# Patient Record
Sex: FEMALE | Race: WHITE | NOT HISPANIC OR LATINO | Employment: FULL TIME | URBAN - METROPOLITAN AREA
[De-identification: names, ages, dates, MRNs, and addresses within clinical notes are randomized per-mention and may not be internally consistent; named-entity substitution may affect disease eponyms.]

---

## 2018-02-08 ENCOUNTER — OFFICE VISIT CONVERTED (OUTPATIENT)
Dept: ORTHOPEDIC SURGERY | Facility: CLINIC | Age: 23
End: 2018-02-08
Attending: ORTHOPAEDIC SURGERY

## 2018-04-09 ENCOUNTER — OFFICE VISIT CONVERTED (OUTPATIENT)
Dept: ORTHOPEDIC SURGERY | Facility: CLINIC | Age: 23
End: 2018-04-09
Attending: ORTHOPAEDIC SURGERY

## 2018-05-21 ENCOUNTER — OFFICE VISIT CONVERTED (OUTPATIENT)
Dept: ORTHOPEDIC SURGERY | Facility: CLINIC | Age: 23
End: 2018-05-21
Attending: ORTHOPAEDIC SURGERY

## 2019-10-22 ENCOUNTER — OFFICE VISIT CONVERTED (OUTPATIENT)
Dept: ORTHOPEDIC SURGERY | Facility: CLINIC | Age: 24
End: 2019-10-22
Attending: ORTHOPAEDIC SURGERY

## 2020-04-25 ENCOUNTER — HOSPITAL ENCOUNTER (OUTPATIENT)
Dept: URGENT CARE | Facility: CLINIC | Age: 25
Discharge: HOME | End: 2020-04-25
Attending: PHYSICIAN ASSISTANT

## 2020-04-25 LAB — GLUCOSE BLD-MCNC: 107 MG/DL (ref 65–99)

## 2020-04-27 LAB — BACTERIA SPEC AEROBE CULT: NORMAL

## 2021-01-05 ENCOUNTER — OFFICE VISIT CONVERTED (OUTPATIENT)
Dept: ORTHOPEDIC SURGERY | Facility: CLINIC | Age: 26
End: 2021-01-05
Attending: ORTHOPAEDIC SURGERY

## 2021-01-21 ENCOUNTER — HOSPITAL ENCOUNTER (OUTPATIENT)
Dept: MRI IMAGING | Facility: HOSPITAL | Age: 26
Discharge: HOME | End: 2021-01-21
Attending: ORTHOPAEDIC SURGERY

## 2021-01-26 ENCOUNTER — OFFICE VISIT CONVERTED (OUTPATIENT)
Dept: ORTHOPEDIC SURGERY | Facility: CLINIC | Age: 26
End: 2021-01-26
Attending: ORTHOPAEDIC SURGERY

## 2021-05-10 NOTE — H&P
"   History and Physical      Patient Name: Britta Paul   Patient ID: [id number]   Sex: Female   YOB: 1995        Visit Date: January 5, 2021    Provider: Pablo Correa MD   Location: [facility]   Location Address: [address] [place] [address]  [id number]   Location Phone: [phone]          Chief Complaint  · Right knee pain      History Of Present Illness  Britta Paul is a 25 year old /White female who presents today to [facility]. Patient presents for evaluation of right knee pain. Patient was seen back in 2019 for right knee pain, mainly in her kneecap with some loose sensation of her right kneecap. Patient states she rested the knee and it got better. Patient states she has been doing well up until this past November 2020 she states her pain returning got worse over the last few months. Patient states she was at work, she works at [employer] and her right knee \"gave out \"on 12/18/2020, she had pain, she went to the ER, had x-rays. Patient has been taking ibuprofen, states her pain persists in the anterior knee, she states the pain is worse with standing walking for long periods. Patient presents with a cane, she states she use the cane when her knee bothers her after long day. Patient denies swelling to the knee, admits to popping, denies locking and catching. Patient remembered her home exercises with some improvement.       Past Medical History  Anemia, Unspecified; Anxiety; Asthma; Chest pain; Constipation; Depression; Diabetes; Ganglion cyst of wrist, right; Ganglion cyst--right wrist; Limb Swelling; Pain: Wrist, right; Right ulnar sided wrist pain; Right wrist pain; Right wrist pain--ulnar sided; Seasonal allergies; Shortness of Breath         Past Surgical History  Colonoscopy; [place] Tooth Extraction         Medication List  Daily Multiple  mg-mcg oral tablet; ibuprofen 200 mg oral capsule; iron 325 mg (65 mg iron) oral capsule, extended " "release; Stool Softener 50 mg oral capsule; Tylenol 325 mg oral tablet         Allergy List  Latex; Latex Exam Gloves       Allergies Reconciled  Family Medical History  Diabetes, unspecified type; - No Family History of Colorectal Cancer; Family history of certain chronic disabling diseases; arthritis; Family history of Arthritis         Social History  Alcohol (Current some day); Alcohol Use (Current some day); lives with other; Recreational Drug Use (Never); Single.; Tobacco (Never); Working         Review of Systems  · Constitutional  o Denies  o : fever, chills, weight loss  · Cardiovascular  o Denies  o : chest pain, shortness of breath  · Gastrointestinal  o Denies  o : liver disease, heartburn, nausea, blood in stools  · Genitourinary  o Denies  o : painful urination, blood in urine  · Integument  o Denies  o : rash, itching  · Neurologic  o Denies  o : headache, weakness, loss of consciousness  · Musculoskeletal  o Denies  o : painful, swollen joints  · Psychiatric  o Denies  o : drug/alcohol addiction, anxiety, depression      Vitals  Date Time BP Position Site L\R Cuff Size HR RR TEMP (F) WT  HT  BMI kg/m2 BSA m2 O2 Sat FR L/min FiO2 HC       01/05/2021 03:40 PM         153lbs 0oz 5'  5\" 25.46 1.78             Physical Examination  · Constitutional  o Appearance  o : well developed, well-nourished, no obvious deformities present  · Head and Face  o Head  o :   § Inspection  § : normocephalic  o Face  o :   § Inspection  § : no facial lesions  · Eyes  o Conjunctivae  o : conjunctivae normal  o Sclerae  o : sclerae white  · Ears, Nose, Mouth and Throat  o Ears  o :   § External Ears  § : appearance within normal limits  § Hearing  § : intact  o Nose  o :   § External Nose  § : appearance normal  · Neck  o Inspection/Palpation  o : normal appearance  o Range of Motion  o : full range of motion  · Respiratory  o Respiratory Effort  o : breathing unlabored  o Inspection of Chest  o : normal " appearance  o Auscultation of Lungs  o : no audible wheezing or rales  · Cardiovascular  o Heart  o : regular rate  · Gastrointestinal  o Abdominal Examination  o : soft and non-tender  · Skin and Subcutaneous Tissue  o General Inspection  o : intact, no rashes  · Psychiatric  o General  o : Alert and oriented x3  o Judgement and Insight  o : judgment and insight intact  o Mood and Affect  o : mood normal, affect appropriate  · Right Knee  o Inspection  o : Skin is intact, no erythema, no swelling, no ecchymosis, no effusion, no signs of infection. Nontender to palpation. No pain with range of motion. Full extension, flexion 120, stable anterior/posterior drawer, stable to varus/valgus stress normal patellar tracking.  · Imaging  o Imaging  o : X-ray right knee, 12/18/2020, conclusion: Negative right knee series.          Assessment  · Right knee pain, unspecified chronicity     719.46/M25.561  · Right knee injury     959.7/S89.91XA      Plan  · Instructions  o Reviewed the patient's Past Medical, Social, and Family history as well as the ROS at today's visit, no changes.  o Call or return if worsening symptoms.  o Follow up after MRI.  o The above service was scribed by Macho Sarmiento PA-C on my behalf and I attest to the accuracy of the note. jsb  o Reviewed x-ray with the patient, discussed need for MRI for further evaluation of the right knee. Patient states she has knee brace at home, she was advised to use knee brace, weightbearing and activity as tolerated, follow-up after MRI.            Electronically Signed by: Gerardo Sarmiento PA-C -Author on January 5, 2021 04:42:31 PM  Electronically Co-signed by: Pablo Correa MD -Reviewer on January 5, 2021 06:45:06 PM

## 2021-05-14 VITALS — BODY MASS INDEX: 25.33 KG/M2 | HEIGHT: 65 IN | WEIGHT: 152 LBS

## 2021-05-14 VITALS — HEIGHT: 65 IN | BODY MASS INDEX: 25.49 KG/M2 | WEIGHT: 153 LBS

## 2021-05-14 NOTE — PROGRESS NOTES
Progress Note      Patient Name: Britta Paul   Patient ID: [id number]   Sex: Female   YOB: 1995        Visit Date: January 26, 2021    Provider: Pablo Correa MD   Location: [facility]   Location Address: [address] [place] [address]  [id number]   Location Phone: [phone]          Chief Complaint  · Right knee pain      History Of Present Illness  Britta Paul is a 26 year old /White female who presents today to [facility]. Patient presents for follow-up evaluation of right knee pain and to review right knee MRI. Patient was recently seen on January 5, planing of increased right knee pain, states her knee has given out and caused her pain in the anterior superior knee, around her kneecap, worse with working, patient works at [employer] working on her feet all day. Patient was seen in the past for similar knee pain symptoms. Patient states the knee occasionally swells, she admits to popping, denies locking and catching. Patient has been doing home exercises from past physical therapy visits with no improvement.       Past Medical History  Anemia, Unspecified; Anxiety; Asthma; Chest pain; Constipation; Depression; Diabetes; Ganglion cyst of wrist, right; Ganglion cyst--right wrist; Limb Swelling; Pain: Wrist, right; Right ulnar sided wrist pain; Right wrist pain; Right wrist pain--ulnar sided; Seasonal allergies; Shortness of Breath         Past Surgical History  Colonoscopy; [place] Tooth Extraction         Medication List  Daily Multiple  mg-mcg oral tablet; ibuprofen 200 mg oral capsule; iron 325 mg (65 mg iron) oral capsule, extended release; Stool Softener 50 mg oral capsule; Tylenol 325 mg oral tablet         Allergy List  Latex; Latex Exam Gloves       Allergies Reconciled  Family Medical History  Diabetes, unspecified type; - No Family History of Colorectal Cancer; Family history of certain chronic disabling diseases; arthritis; Family history of  "Arthritis         Social History  Alcohol (Current some day); Alcohol Use (Current some day); lives with other; Recreational Drug Use (Never); Single.; Tobacco (Never); Working         Review of Systems  · Constitutional  o Denies  o : fever, chills, weight loss  · Cardiovascular  o Denies  o : chest pain, shortness of breath  · Gastrointestinal  o Denies  o : liver disease, heartburn, nausea, blood in stools  · Genitourinary  o Denies  o : painful urination, blood in urine  · Integument  o Denies  o : rash, itching  · Neurologic  o Denies  o : headache, weakness, loss of consciousness  · Musculoskeletal  o Denies  o : painful, swollen joints  · Psychiatric  o Denies  o : drug/alcohol addiction, anxiety, depression      Vitals  Date Time BP Position Site L\R Cuff Size HR RR TEMP (F) WT  HT  BMI kg/m2 BSA m2 O2 Sat FR L/min FiO2        01/26/2021 02:55 PM         152lbs 0oz 5'  5\" 25.29 1.78             Physical Examination  · Constitutional  o Appearance  o : well developed, well-nourished, no obvious deformities present  · Head and Face  o Head  o :   § Inspection  § : normocephalic  o Face  o :   § Inspection  § : no facial lesions  · Eyes  o Conjunctivae  o : conjunctivae normal  o Sclerae  o : sclerae white  · Ears, Nose, Mouth and Throat  o Ears  o :   § External Ears  § : appearance within normal limits  § Hearing  § : intact  o Nose  o :   § External Nose  § : appearance normal  · Neck  o Inspection/Palpation  o : normal appearance  o Range of Motion  o : full range of motion  · Respiratory  o Respiratory Effort  o : breathing unlabored  o Inspection of Chest  o : normal appearance  o Auscultation of Lungs  o : no audible wheezing or rales  · Cardiovascular  o Heart  o : regular rate  · Gastrointestinal  o Abdominal Examination  o : soft and non-tender  · Skin and Subcutaneous Tissue  o General Inspection  o : intact, no rashes  · Psychiatric  o General  o : Alert and oriented x3  o Judgement and " Insight  o : judgment and insight intact  o Mood and Affect  o : mood normal, affect appropriate  · Right Knee  o Inspection  o : Skin is intact, no erythema, no swelling, no ecchymosis, no effusion, no signs of infection. Nontender to palpation. No pain with range of motion. Full extension, flexion 120, stable anterior/posterior drawer, stable to varus/valgus stress normal patellar tracking.   · Imaging  o Imaging  o : MRI right knee without contrast, 1/21/2021, conclusion: 1. No evidence for internal derangement or evidence for trochlear dysplasia type A. Note is also made of lateral insertion of the distal patellar tendon. The findings indicate anatomic variation and may lead to a tracking disturbance of the patella.X-ray right knee, 12/18/2020, conclusion: Negative right knee series.           Assessment  · Right knee pain, unspecified chronicity     719.46/M25.561  · Patellar maltracking, right     719.86/M22.8X1      Plan  · Medications  o Medications have been Reconciled  o Transition of Care or Provider Policy  · Instructions  o Reviewed the patient's Past Medical, Social, and Family history as well as the ROS at today's visit, no changes.  o Call or return if worsening symptoms.  o Follow up as needed.  o Reviewed MRI with the patient, advised her we will refer her to [facility] medicine, Dr. Mart for further evaluation. Follow-up as needed.            Electronically Signed by: ELSA Moss-MATT -Author on January 26, 2021 05:09:20 PM  Electronically Co-signed by: Pablo Correa MD -Reviewer on January 27, 2021 07:17:47 AM

## 2021-05-15 VITALS — OXYGEN SATURATION: 99 % | WEIGHT: 160.25 LBS | HEIGHT: 65 IN | HEART RATE: 70 BPM | BODY MASS INDEX: 26.7 KG/M2

## 2021-05-16 VITALS — BODY MASS INDEX: 24.33 KG/M2 | HEART RATE: 78 BPM | HEIGHT: 66 IN | OXYGEN SATURATION: 99 % | WEIGHT: 151.37 LBS

## 2021-05-16 VITALS — WEIGHT: 158.12 LBS | BODY MASS INDEX: 25.41 KG/M2 | HEIGHT: 66 IN | OXYGEN SATURATION: 99 % | HEART RATE: 79 BPM

## 2021-05-16 VITALS — WEIGHT: 138 LBS | OXYGEN SATURATION: 96 % | BODY MASS INDEX: 21.66 KG/M2 | HEIGHT: 67 IN | HEART RATE: 67 BPM

## 2021-09-07 ENCOUNTER — APPOINTMENT (OUTPATIENT)
Dept: GENERAL RADIOLOGY | Facility: HOSPITAL | Age: 26
End: 2021-09-07

## 2021-09-07 ENCOUNTER — HOSPITAL ENCOUNTER (EMERGENCY)
Facility: HOSPITAL | Age: 26
Discharge: HOME | End: 2021-09-07
Attending: EMERGENCY MEDICINE | Admitting: EMERGENCY MEDICINE

## 2021-09-07 VITALS
WEIGHT: 144.62 LBS | OXYGEN SATURATION: 99 % | DIASTOLIC BLOOD PRESSURE: 59 MMHG | HEIGHT: 65 IN | TEMPERATURE: 98.4 F | HEART RATE: 55 BPM | SYSTOLIC BLOOD PRESSURE: 108 MMHG | RESPIRATION RATE: 15 BRPM | BODY MASS INDEX: 24.1 KG/M2

## 2021-09-07 DIAGNOSIS — Z20.822 COVID-19 VIRUS TEST RESULT UNKNOWN: ICD-10-CM

## 2021-09-07 DIAGNOSIS — N39.0 ACUTE UTI: ICD-10-CM

## 2021-09-07 DIAGNOSIS — R55 NEAR SYNCOPE: Primary | ICD-10-CM

## 2021-09-07 LAB
ALBUMIN SERPL-MCNC: 4.5 G/DL (ref 3.5–5.2)
ALBUMIN/GLOB SERPL: 1.7 G/DL
ALP SERPL-CCNC: 51 U/L (ref 39–117)
ALT SERPL W P-5'-P-CCNC: 7 U/L (ref 1–33)
ANION GAP SERPL CALCULATED.3IONS-SCNC: 9.2 MMOL/L (ref 5–15)
AST SERPL-CCNC: 10 U/L (ref 1–32)
B-HCG UR QL: NEGATIVE
BACTERIA UR QL AUTO: ABNORMAL /HPF
BASOPHILS # BLD AUTO: 0.03 10*3/MM3 (ref 0–0.2)
BASOPHILS NFR BLD AUTO: 0.4 % (ref 0–1.5)
BILIRUB SERPL-MCNC: 0.3 MG/DL (ref 0–1.2)
BILIRUB UR QL STRIP: NEGATIVE
BUN SERPL-MCNC: 8 MG/DL (ref 6–20)
BUN/CREAT SERPL: 11.8 (ref 7–25)
CALCIUM SPEC-SCNC: 9.1 MG/DL (ref 8.6–10.5)
CHLORIDE SERPL-SCNC: 106 MMOL/L (ref 98–107)
CLARITY UR: ABNORMAL
CO2 SERPL-SCNC: 21.8 MMOL/L (ref 22–29)
COLOR UR: YELLOW
CREAT SERPL-MCNC: 0.68 MG/DL (ref 0.57–1)
DEPRECATED RDW RBC AUTO: 45.4 FL (ref 37–54)
EOSINOPHIL # BLD AUTO: 0.03 10*3/MM3 (ref 0–0.4)
EOSINOPHIL NFR BLD AUTO: 0.4 % (ref 0.3–6.2)
ERYTHROCYTE [DISTWIDTH] IN BLOOD BY AUTOMATED COUNT: 14.1 % (ref 12.3–15.4)
GFR SERPL CREATININE-BSD FRML MDRD: 103 ML/MIN/1.73
GLOBULIN UR ELPH-MCNC: 2.6 GM/DL
GLUCOSE SERPL-MCNC: 88 MG/DL (ref 65–99)
GLUCOSE UR STRIP-MCNC: NEGATIVE MG/DL
HCT VFR BLD AUTO: 39.4 % (ref 34–46.6)
HGB BLD-MCNC: 13.1 G/DL (ref 12–15.9)
HGB UR QL STRIP.AUTO: NEGATIVE
HOLD SPECIMEN: NORMAL
HOLD SPECIMEN: NORMAL
HYALINE CASTS UR QL AUTO: ABNORMAL /LPF
IMM GRANULOCYTES # BLD AUTO: 0.02 10*3/MM3 (ref 0–0.05)
IMM GRANULOCYTES NFR BLD AUTO: 0.3 % (ref 0–0.5)
KETONES UR QL STRIP: NEGATIVE
LEUKOCYTE ESTERASE UR QL STRIP.AUTO: ABNORMAL
LYMPHOCYTES # BLD AUTO: 2.42 10*3/MM3 (ref 0.7–3.1)
LYMPHOCYTES NFR BLD AUTO: 32 % (ref 19.6–45.3)
MCH RBC QN AUTO: 29.4 PG (ref 26.6–33)
MCHC RBC AUTO-ENTMCNC: 33.2 G/DL (ref 31.5–35.7)
MCV RBC AUTO: 88.5 FL (ref 79–97)
MONOCYTES # BLD AUTO: 0.68 10*3/MM3 (ref 0.1–0.9)
MONOCYTES NFR BLD AUTO: 9 % (ref 5–12)
NEUTROPHILS NFR BLD AUTO: 4.38 10*3/MM3 (ref 1.7–7)
NEUTROPHILS NFR BLD AUTO: 57.9 % (ref 42.7–76)
NITRITE UR QL STRIP: POSITIVE
NRBC BLD AUTO-RTO: 0 /100 WBC (ref 0–0.2)
PH UR STRIP.AUTO: 6.5 [PH] (ref 5–8)
PLATELET # BLD AUTO: 310 10*3/MM3 (ref 140–450)
PMV BLD AUTO: 10.8 FL (ref 6–12)
POTASSIUM SERPL-SCNC: 4.4 MMOL/L (ref 3.5–5.2)
PROT SERPL-MCNC: 7.1 G/DL (ref 6–8.5)
PROT UR QL STRIP: NEGATIVE
RBC # BLD AUTO: 4.45 10*6/MM3 (ref 3.77–5.28)
RBC # UR: ABNORMAL /HPF
REF LAB TEST METHOD: ABNORMAL
S PYO AG THROAT QL: NEGATIVE
SARS-COV-2 RNA RESP QL NAA+PROBE: NOT DETECTED
SODIUM SERPL-SCNC: 137 MMOL/L (ref 136–145)
SP GR UR STRIP: 1.01 (ref 1–1.03)
SQUAMOUS #/AREA URNS HPF: ABNORMAL /HPF
UROBILINOGEN UR QL STRIP: ABNORMAL
WBC # BLD AUTO: 7.56 10*3/MM3 (ref 3.4–10.8)
WBC UR QL AUTO: ABNORMAL /HPF
WHOLE BLOOD HOLD SPECIMEN: NORMAL
WHOLE BLOOD HOLD SPECIMEN: NORMAL

## 2021-09-07 PROCEDURE — 87880 STREP A ASSAY W/OPTIC: CPT | Performed by: NURSE PRACTITIONER

## 2021-09-07 PROCEDURE — 71045 X-RAY EXAM CHEST 1 VIEW: CPT

## 2021-09-07 PROCEDURE — 99284 EMERGENCY DEPT VISIT MOD MDM: CPT

## 2021-09-07 PROCEDURE — 87081 CULTURE SCREEN ONLY: CPT | Performed by: NURSE PRACTITIONER

## 2021-09-07 PROCEDURE — 81001 URINALYSIS AUTO W/SCOPE: CPT | Performed by: NURSE PRACTITIONER

## 2021-09-07 PROCEDURE — 81025 URINE PREGNANCY TEST: CPT | Performed by: NURSE PRACTITIONER

## 2021-09-07 PROCEDURE — 25010000002 KETOROLAC TROMETHAMINE PER 15 MG: Performed by: NURSE PRACTITIONER

## 2021-09-07 PROCEDURE — 87186 SC STD MICRODIL/AGAR DIL: CPT | Performed by: NURSE PRACTITIONER

## 2021-09-07 PROCEDURE — 36415 COLL VENOUS BLD VENIPUNCTURE: CPT

## 2021-09-07 PROCEDURE — 93005 ELECTROCARDIOGRAM TRACING: CPT | Performed by: NURSE PRACTITIONER

## 2021-09-07 PROCEDURE — 80053 COMPREHEN METABOLIC PANEL: CPT

## 2021-09-07 PROCEDURE — U0003 INFECTIOUS AGENT DETECTION BY NUCLEIC ACID (DNA OR RNA); SEVERE ACUTE RESPIRATORY SYNDROME CORONAVIRUS 2 (SARS-COV-2) (CORONAVIRUS DISEASE [COVID-19]), AMPLIFIED PROBE TECHNIQUE, MAKING USE OF HIGH THROUGHPUT TECHNOLOGIES AS DESCRIBED BY CMS-2020-01-R: HCPCS | Performed by: NURSE PRACTITIONER

## 2021-09-07 PROCEDURE — 87086 URINE CULTURE/COLONY COUNT: CPT | Performed by: NURSE PRACTITIONER

## 2021-09-07 PROCEDURE — 85025 COMPLETE CBC W/AUTO DIFF WBC: CPT

## 2021-09-07 PROCEDURE — 96374 THER/PROPH/DIAG INJ IV PUSH: CPT

## 2021-09-07 PROCEDURE — 93010 ELECTROCARDIOGRAM REPORT: CPT | Performed by: INTERNAL MEDICINE

## 2021-09-07 PROCEDURE — 87077 CULTURE AEROBIC IDENTIFY: CPT | Performed by: NURSE PRACTITIONER

## 2021-09-07 RX ORDER — CEPHALEXIN 500 MG/1
500 CAPSULE ORAL 4 TIMES DAILY
Qty: 28 CAPSULE | Refills: 0 | Status: SHIPPED | OUTPATIENT
Start: 2021-09-07 | End: 2022-01-13

## 2021-09-07 RX ORDER — SODIUM CHLORIDE 0.9 % (FLUSH) 0.9 %
10 SYRINGE (ML) INJECTION AS NEEDED
Status: DISCONTINUED | OUTPATIENT
Start: 2021-09-07 | End: 2021-09-07 | Stop reason: HOSPADM

## 2021-09-07 RX ORDER — MULTIPLE VITAMINS W/ MINERALS TAB 9MG-400MCG
1 TAB ORAL DAILY
COMMUNITY

## 2021-09-07 RX ORDER — KETOROLAC TROMETHAMINE 15 MG/ML
15 INJECTION, SOLUTION INTRAMUSCULAR; INTRAVENOUS ONCE
Status: COMPLETED | OUTPATIENT
Start: 2021-09-07 | End: 2021-09-07

## 2021-09-07 RX ADMIN — KETOROLAC TROMETHAMINE 15 MG: 15 INJECTION, SOLUTION INTRAMUSCULAR; INTRAVENOUS at 13:26

## 2021-09-07 NOTE — ED PROVIDER NOTES
"Subjective    Chief Complaint   Patient presents with   • Headache   • Dizziness   • Abdominal Pain     Kwabena Rowell MD  No LMP recorded.  Allergies   Allergen Reactions   • Latex Unknown - High Severity       History of Present Illness  Patient is a 26-year-old female, presents emergency department with complaint of generalized weakness, headache, near syncope, abdominal pain.  Patient reports this began yesterday, she reports that she did feeling bad at work yesterday, and almost had a near syncopal episode.  She states that when she \"came to\" more she thought she saw a black, dog.  She otherwise denies any hallucinations.  No auditory hallucinations.  No vision changes.  No thunderclap headache.  No speech disturbances.  No lethargy or ataxia.  Patient has not had COVID-19 in the past.  She is not been vaccinated.  Review of Systems   Constitutional: Negative for chills and fever.   Eyes: Negative for photophobia and visual disturbance.   Respiratory: Negative for cough, chest tightness and shortness of breath.    Cardiovascular: Negative for chest pain, palpitations and leg swelling.   Gastrointestinal: Positive for abdominal pain. Negative for diarrhea, nausea and vomiting.   Genitourinary: Negative for dysuria.   Musculoskeletal: Negative for back pain and neck pain.   Skin: Negative for color change and rash.   Neurological: Positive for syncope (Near syncope), weakness (Generalized) and headaches. Negative for dizziness, tremors, seizures, facial asymmetry, speech difficulty, light-headedness and numbness.       Past Medical History:   Diagnosis Date   • Anemia    • Asthma    • Hypoglycemia        Allergies   Allergen Reactions   • Latex Unknown - High Severity       Past Surgical History:   Procedure Laterality Date   • DENTAL PROCEDURE         History reviewed. No pertinent family history.    Social History     Socioeconomic History   • Marital status: Single     Spouse name: Not on file   • Number of " children: Not on file   • Years of education: Not on file   • Highest education level: Not on file   Tobacco Use   • Smoking status: Never Smoker   • Smokeless tobacco: Never Used   Substance and Sexual Activity   • Alcohol use: Yes     Comment: Occasional   • Drug use: Never           Objective   Physical Exam  Vitals and nursing note reviewed.   Constitutional:       General: She is not in acute distress.     Appearance: She is well-developed. She is not ill-appearing, toxic-appearing or diaphoretic.   HENT:      Head: Normocephalic and atraumatic.      Mouth/Throat:      Mouth: Mucous membranes are moist.      Pharynx: Oropharynx is clear. No pharyngeal swelling or oropharyngeal exudate.   Eyes:      Extraocular Movements: Extraocular movements intact.      Pupils: Pupils are equal, round, and reactive to light.   Cardiovascular:      Rate and Rhythm: Normal rate and regular rhythm.      Heart sounds: Normal heart sounds. No murmur heard.   No friction rub. No gallop.    Pulmonary:      Effort: Pulmonary effort is normal. No respiratory distress.      Breath sounds: Normal breath sounds. No stridor. No wheezing or rhonchi.   Chest:      Chest wall: No tenderness.   Abdominal:      General: Abdomen is protuberant. Bowel sounds are normal.      Palpations: Abdomen is soft.      Tenderness: There is no abdominal tenderness (No peritoneal signs). There is no guarding or rebound.   Skin:     General: Skin is warm and dry.      Capillary Refill: Capillary refill takes less than 2 seconds.   Neurological:      General: No focal deficit present.      Mental Status: She is alert and oriented to person, place, and time.      GCS: GCS eye subscore is 4. GCS verbal subscore is 5. GCS motor subscore is 6.      Cranial Nerves: No dysarthria or facial asymmetry.      Motor: No weakness or pronator drift.      Coordination: Coordination normal. Finger-Nose-Finger Test normal.      Gait: Gait is intact.      Comments: Cranial  "nerves II-XII intact.    Psychiatric:         Mood and Affect: Mood normal.         Behavior: Behavior normal.         Procedures           ED Course      /59   Pulse 55   Temp 98.4 °F (36.9 °C) (Oral)   Resp 15   Ht 165.1 cm (65\")   Wt 65.6 kg (144 lb 10 oz)   SpO2 99%   BMI 24.07 kg/m²   Labs Reviewed   COMPREHENSIVE METABOLIC PANEL - Abnormal; Notable for the following components:       Result Value    CO2 21.8 (*)     All other components within normal limits    Narrative:     GFR Normal >60  Chronic Kidney Disease <60  Kidney Failure <15     URINALYSIS W/ MICROSCOPIC IF INDICATED (NO CULTURE) - Abnormal; Notable for the following components:    Appearance, UA Turbid (*)     Leuk Esterase, UA Moderate (2+) (*)     Nitrite, UA Positive (*)     All other components within normal limits   URINALYSIS, MICROSCOPIC ONLY - Abnormal; Notable for the following components:    RBC, UA 0-2 (*)     WBC, UA 6-12 (*)     Bacteria, UA 4+ (*)     Squamous Epithelial Cells, UA Too Numerous to Count (*)     All other components within normal limits   RAPID STREP A SCREEN - Normal   CBC WITH AUTO DIFFERENTIAL - Normal   PREGNANCY, URINE - Normal    Narrative:     Sensitive immunoassays may demonstrate false positive results  with specimens containing heterophilic antibodies. Assays may  also exhibit false-positive or false-negative results with  specimens containing human anti-mouse antibodies. These   specimens may come from patients receving preparations of  mouse monoclonal antibodies for diagnosis or therapy or having  been exposed to mice. If the qualitative interpretation is  inconsistent with the clinical evaluation, results should be   confirmed by an alternate hCG method, ie. quantitative hCG.  As with all urine pregnancy test, this test does not reliably  detect hCG degradation products, including free-beta hCG and  beta core fragments.   COVID-19,CEPHEID/KUSH/BDMAX,COR/INGRIS/PAD/YANELI IN-HOUSE,NP SWAB IN " TRANSPORT MEDIA 3-4 HR TAT, RT-PCR   BETA HEMOLYTIC STREP CULTURE, THROAT   BETA HEMOLYTIC STREP CULTURE, THROAT   URINE CULTURE   RAINBOW DRAW    Narrative:     The following orders were created for panel order Malcolm Draw.  Procedure                               Abnormality         Status                     ---------                               -----------         ------                     Green Top (Gel)[[id number]]                                  Final result               Lavender Top[[id number]]                                     Final result               Gold Top - SST[[id number]]                                   Final result               Light Blue Top[[id number]]                                   Final result                 Please view results for these tests on the individual orders.   CBC AND DIFFERENTIAL    Narrative:     The following orders were created for panel order CBC & Differential.  Procedure                               Abnormality         Status                     ---------                               -----------         ------                     CBC Auto Differential[[id number]]        Normal              Final result                 Please view results for these tests on the individual orders.   GREEN TOP   LAVENDER TOP   GOLD TOP - SST   LIGHT BLUE TOP     Medications   ketorolac (TORADOL) injection 15 mg (15 mg Intravenous Given 9/7/21 1326)     XR Chest 1 View    Result Date: 9/7/2021  Narrative: PROCEDURE: XR CHEST 1 VW  COMPARISON: [facility], CR, CHEST PA/AP & LAT 2V, 4/25/2020, 18:07.  INDICATIONS: NEAR SYNCOPE; DIZZINESS  FINDINGS:  The lungs are clear. Cardiac, hilar, and mediastinal silhouettes are unremarkable. No pneumothorax or pleural effusion. Bony structures are intact.  The trachea is midline.      CONCLUSION: No active cardiopulmonary disease.       HEATH LAKE DO       Electronically Signed and Approved By: HEATH LAKE DO on 9/07/2021 at 12:59                                                   MDM  Number of Diagnoses or Management Options     Amount and/or Complexity of Data Reviewed  Clinical lab tests: reviewed  Tests in the radiology section of CPT®: reviewed  Tests in the medicine section of CPT®: reviewed    Appropriate PPE was worn during the duration of the care for this patient while in the emergency department per [facility] Policy    ----Differentials: Orthostatic hypotension, electrolyte abnormality, COVID-19  This list is not all inclusive and does not constitute the entireity of considered causes.     ----Lab and imaging reviewd by me, imaging interpreted per radiologist and independly viewed by me:     As above.  CBC, CMP nonconcerning.  Urine is nitrite positive.  Patient reports she frequently gets UTIs, question UTI versus contamination.  Culture added on.  Strep is negative.  COVID-19 test pending.  Patient made aware of need to check for results.    Patient continues have nonfocal neuro exam.  Nonconcerning vital signs.    ED  Course----Patient was brought back to the emergency department room for evaluation and placed on appropriate monitoring.    Vital signs have  been reviewed. Patient is afebrile. Non toxic in appearance. Alert and oriented to person, place, time and situation.  Patient is nonfocal neuro exam.  Resting comfortably in bed without acute distress.  Patient will be placed on antibiotic given her urine, culture pending.  Advise follow-up with urology given her report of frequent UTIs.  Patient was advised to quarantine or isolate appropriately given COVID-19 result.    I spoke with the patient at the bedside regarding their plan of care, discharge instruction, home care, prescriptions, and importance follow-up.  We discussed test results at the bedside, including incidental abnormal labs, radiological findings, understands need for follow-up with primary care or specialist if indicated.      Patient was made aware of  indications to return to the emergency department.  Patient agrees with the current plan of care for discharge, verbalized understanding of all instructions    Pt is aware that discharge does not mean that nothing is wrong but it indicates no emergency is present and they must continue care with follow-up as given below or physician of their choice                            Final diagnoses:   Near syncope   Acute UTI   COVID-19 virus test result unknown       ED Disposition  ED Disposition     ED Disposition Condition Comment    Discharge Stable           [facility] EMERGENCY ROOM  [address] [facility] [address] [phone]    As needed, If symptoms worsen    Kwabena Rowell MD  [address] [facility] [id number] [phone]          Ana Rosa Galvin MD  [address] [facility] [id number] [phone]    Schedule an appointment as soon as possible for a visit            Medication List      New Prescriptions    cephalexin 500 MG capsule  Commonly known as: KEFLEX  Take 1 capsule by mouth 4 (Four) Times a Day.           Where to Get Your Medications      These medications were sent to [facility] [address] [place] [address], [place] [facility] [address] [facility] [phone] [facility] [phone] [address] [facility] [address]    Phone: [phone]   · cephalexin 500 MG capsule          Nancy Lin, APRN  09/07/21 3850

## 2021-09-07 NOTE — DISCHARGE INSTRUCTIONS
Your COVID-19 test is pending, you should receive a result via [employer], or you will be called with a positive test result  Please take appropriate precautions given your COVID-19 test, per CDC guidelines  Return to the ED for new or worsening symptoms  Complete entire course of antibiotics  Follow-up with urology above, given your report of frequent urinary tract infection

## 2021-09-08 LAB — QT INTERVAL: 401 MS

## 2021-09-09 LAB
BACTERIA SPEC AEROBE CULT: ABNORMAL
BACTERIA SPEC AEROBE CULT: NORMAL
BACTERIA SPEC AEROBE CULT: NORMAL

## 2022-01-13 ENCOUNTER — HOSPITAL ENCOUNTER (EMERGENCY)
Facility: HOSPITAL | Age: 27
Discharge: HOME | End: 2022-01-13
Attending: EMERGENCY MEDICINE | Admitting: EMERGENCY MEDICINE

## 2022-01-13 VITALS
BODY MASS INDEX: 24.61 KG/M2 | HEART RATE: 75 BPM | HEIGHT: 65 IN | SYSTOLIC BLOOD PRESSURE: 114 MMHG | OXYGEN SATURATION: 99 % | RESPIRATION RATE: 20 BRPM | TEMPERATURE: 98.8 F | DIASTOLIC BLOOD PRESSURE: 74 MMHG | WEIGHT: 147.71 LBS

## 2022-01-13 DIAGNOSIS — R51.9 ACUTE NONINTRACTABLE HEADACHE, UNSPECIFIED HEADACHE TYPE: Primary | ICD-10-CM

## 2022-01-13 PROCEDURE — 25010000002 DIPHENHYDRAMINE PER 50 MG: Performed by: NURSE PRACTITIONER

## 2022-01-13 PROCEDURE — 96374 THER/PROPH/DIAG INJ IV PUSH: CPT

## 2022-01-13 PROCEDURE — 25010000002 METOCLOPRAMIDE PER 10 MG: Performed by: NURSE PRACTITIONER

## 2022-01-13 PROCEDURE — 25010000002 KETOROLAC TROMETHAMINE PER 15 MG: Performed by: NURSE PRACTITIONER

## 2022-01-13 PROCEDURE — 99282 EMERGENCY DEPT VISIT SF MDM: CPT

## 2022-01-13 PROCEDURE — 96375 TX/PRO/DX INJ NEW DRUG ADDON: CPT

## 2022-01-13 RX ORDER — METOCLOPRAMIDE HYDROCHLORIDE 5 MG/ML
10 INJECTION INTRAMUSCULAR; INTRAVENOUS ONCE
Status: DISCONTINUED | OUTPATIENT
Start: 2022-01-13 | End: 2022-01-13

## 2022-01-13 RX ORDER — KETOROLAC TROMETHAMINE 30 MG/ML
30 INJECTION, SOLUTION INTRAMUSCULAR; INTRAVENOUS ONCE
Status: COMPLETED | OUTPATIENT
Start: 2022-01-13 | End: 2022-01-13

## 2022-01-13 RX ORDER — DIPHENHYDRAMINE HYDROCHLORIDE 50 MG/ML
25 INJECTION INTRAMUSCULAR; INTRAVENOUS ONCE
Status: COMPLETED | OUTPATIENT
Start: 2022-01-13 | End: 2022-01-13

## 2022-01-13 RX ORDER — KETOROLAC TROMETHAMINE 30 MG/ML
30 INJECTION, SOLUTION INTRAMUSCULAR; INTRAVENOUS ONCE
Status: DISCONTINUED | OUTPATIENT
Start: 2022-01-13 | End: 2022-01-13

## 2022-01-13 RX ORDER — SODIUM CHLORIDE 0.9 % (FLUSH) 0.9 %
10 SYRINGE (ML) INJECTION AS NEEDED
Status: DISCONTINUED | OUTPATIENT
Start: 2022-01-13 | End: 2022-01-13 | Stop reason: HOSPADM

## 2022-01-13 RX ORDER — DIPHENHYDRAMINE HYDROCHLORIDE 50 MG/ML
25 INJECTION INTRAMUSCULAR; INTRAVENOUS ONCE
Status: DISCONTINUED | OUTPATIENT
Start: 2022-01-13 | End: 2022-01-13

## 2022-01-13 RX ORDER — METOCLOPRAMIDE HYDROCHLORIDE 5 MG/ML
10 INJECTION INTRAMUSCULAR; INTRAVENOUS ONCE
Status: COMPLETED | OUTPATIENT
Start: 2022-01-13 | End: 2022-01-13

## 2022-01-13 RX ADMIN — KETOROLAC TROMETHAMINE 30 MG: 30 INJECTION, SOLUTION INTRAMUSCULAR; INTRAVENOUS at 10:19

## 2022-01-13 RX ADMIN — DIPHENHYDRAMINE HYDROCHLORIDE 25 MG: 50 INJECTION, SOLUTION INTRAMUSCULAR; INTRAVENOUS at 10:19

## 2022-01-13 RX ADMIN — SODIUM CHLORIDE 1000 ML: 9 INJECTION, SOLUTION INTRAVENOUS at 10:19

## 2022-01-13 RX ADMIN — METOCLOPRAMIDE HYDROCHLORIDE 10 MG: 5 INJECTION INTRAMUSCULAR; INTRAVENOUS at 10:20

## 2022-01-13 NOTE — ED PROVIDER NOTES
Subjective   Patient that she has a long history of chronic headaches/migraines.  Patient states about 2 days ago developed a frontal headache with photophobia.  Patient this is a typical presentation of her chronic migraines.  Patient says she did try taking some Excedrin Migraine with no relief.  Patient denies any additional symptoms and/or concerns.      History provided by:  Patient   used: No    Headache  Pain location:  Frontal  Quality:  Dull  Radiates to:  Does not radiate  Onset quality:  Gradual  Duration: Over the last couple of days.  Timing:  Constant  Progression:  Waxing and waning  Chronicity:  Chronic  Similar to prior headaches: yes    Context: bright light    Relieved by:  Nothing  Worsened by:  Light  Ineffective treatments: Excedrin Migraine.  Associated symptoms: fatigue, nausea and photophobia    Associated symptoms: no abdominal pain, no back pain, no blurred vision, no congestion, no cough, no diarrhea, no dizziness, no drainage, no ear pain, no eye pain, no facial pain, no fever, no focal weakness, no hearing loss, no loss of balance, no myalgias, no near-syncope, no neck pain, no neck stiffness, no numbness, no paresthesias, no seizures, no sinus pressure, no sore throat, no swollen glands, no syncope, no tingling, no URI, no visual change, no vomiting and no weakness        Review of Systems   Constitutional: Positive for fatigue. Negative for chills and fever.   HENT: Negative for congestion, ear pain, hearing loss, postnasal drip, sinus pressure and sore throat.    Eyes: Positive for photophobia. Negative for blurred vision and pain.   Respiratory: Negative for cough, chest tightness and shortness of breath.    Cardiovascular: Negative for chest pain, syncope and near-syncope.   Gastrointestinal: Positive for nausea. Negative for abdominal pain, diarrhea and vomiting.   Genitourinary: Negative for flank pain and hematuria.   Musculoskeletal: Negative for back pain,  joint swelling, myalgias, neck pain and neck stiffness.   Skin: Negative for pallor.   Neurological: Positive for headaches. Negative for dizziness, focal weakness, seizures, weakness, numbness, paresthesias and loss of balance.   Psychiatric/Behavioral: Negative.    All other systems reviewed and are negative.      Past Medical History:   Diagnosis Date   • Anemia    • Asthma    • Hypoglycemia        Allergies   Allergen Reactions   • Latex Unknown - High Severity       Past Surgical History:   Procedure Laterality Date   • DENTAL PROCEDURE         History reviewed. No pertinent family history.    Social History     Socioeconomic History   • Marital status: Single   Tobacco Use   • Smoking status: Never Smoker   • Smokeless tobacco: Never Used   Substance and Sexual Activity   • Alcohol use: Yes     Comment: Occasional   • Drug use: Never           Objective   Physical Exam  Vitals and nursing note reviewed.   Constitutional:       General: She is not in acute distress.     Appearance: Normal appearance. She is not ill-appearing, toxic-appearing or diaphoretic.   HENT:      Head: Normocephalic and atraumatic.      Right Ear: Tympanic membrane normal.      Left Ear: Tympanic membrane normal.      Nose: Nose normal.      Mouth/Throat:      Mouth: Mucous membranes are moist.      Pharynx: No oropharyngeal exudate or posterior oropharyngeal erythema.   Eyes:      General: Lids are normal. Lids are everted, no foreign bodies appreciated. Vision grossly intact. Gaze aligned appropriately. No scleral icterus.     Extraocular Movements: Extraocular movements intact.      Conjunctiva/sclera: Conjunctivae normal.      Pupils: Pupils are equal, round, and reactive to light.   Cardiovascular:      Rate and Rhythm: Normal rate and regular rhythm.      Pulses: Normal pulses.      Heart sounds: Normal heart sounds.   Pulmonary:      Effort: Pulmonary effort is normal. No respiratory distress.      Breath sounds: Normal breath  sounds.   Abdominal:      General: Abdomen is flat.      Palpations: Abdomen is soft.      Tenderness: There is no abdominal tenderness.   Musculoskeletal:         General: Normal range of motion.      Cervical back: Normal range of motion and neck supple.   Skin:     General: Skin is warm and dry.   Neurological:      General: No focal deficit present.      Mental Status: She is alert and oriented to person, place, and time. Mental status is at baseline.   Psychiatric:         Mood and Affect: Mood normal.         Behavior: Behavior normal.         Thought Content: Thought content normal.         Judgment: Judgment normal.         Procedures           ED Course                                                 MDM  Number of Diagnoses or Management Options  Diagnosis management comments: I have spoken with patient. I have explained the patient´s condition, diagnoses and treatment plan based on the information available to me at this time. I have answered the patient's questions and addressed any concerns. The patient has a good  understanding of the patient´s diagnosis, condition, and treatment plan as can be expected at this point. The vital signs have been stable. The patient´s condition is stable and appropriate for discharge from the emergency department.      The patient will pursue further outpatient evaluation with the primary care physician or other designated or consulting physician as outlined in the discharge instructions. They are agreeable to this plan of care and follow-up instructions have been explained in detail. The patient has received these instructions in written format and have expressed an understanding of the discharge instructions. The patient is aware that any significant change in condition or worsening of symptoms should prompt an immediate return to this or the closest emergency department or call to 911.    Risk of Complications, Morbidity, and/or Mortality  Presenting problems:  moderate  Diagnostic procedures: low  Management options: low    Patient Progress  Patient progress: stable      Final diagnoses:   Acute nonintractable headache, unspecified headache type       ED Disposition  ED Disposition     ED Disposition Condition Comment    Discharge Stable           Kwabena Rowell MD  [address] [facility] [address] [phone]    In 3 days           Medication List      No changes were made to your prescriptions during this visit.          Chava Perdue, ENDY  01/13/22 0954       Chava Perdue APRN  01/13/22 1146

## 2022-08-21 ENCOUNTER — HOSPITAL ENCOUNTER (EMERGENCY)
Facility: HOSPITAL | Age: 27
Discharge: HOME | End: 2022-08-21
Attending: EMERGENCY MEDICINE | Admitting: EMERGENCY MEDICINE

## 2022-08-21 VITALS
DIASTOLIC BLOOD PRESSURE: 83 MMHG | SYSTOLIC BLOOD PRESSURE: 113 MMHG | TEMPERATURE: 99.6 F | BODY MASS INDEX: 23.69 KG/M2 | WEIGHT: 142.2 LBS | RESPIRATION RATE: 20 BRPM | OXYGEN SATURATION: 100 % | HEIGHT: 65 IN | HEART RATE: 111 BPM

## 2022-08-21 DIAGNOSIS — B34.9 NONSPECIFIC SYNDROME SUGGESTIVE OF VIRAL ILLNESS: ICD-10-CM

## 2022-08-21 DIAGNOSIS — Z20.822 SUSPECTED COVID-19 VIRUS INFECTION: Primary | ICD-10-CM

## 2022-08-21 LAB
FLUAV AG NPH QL: NEGATIVE
FLUBV AG NPH QL IA: NEGATIVE
S PYO AG THROAT QL: NEGATIVE

## 2022-08-21 PROCEDURE — U0004 COV-19 TEST NON-CDC HGH THRU: HCPCS

## 2022-08-21 PROCEDURE — 87880 STREP A ASSAY W/OPTIC: CPT

## 2022-08-21 PROCEDURE — 87804 INFLUENZA ASSAY W/OPTIC: CPT

## 2022-08-21 PROCEDURE — 99283 EMERGENCY DEPT VISIT LOW MDM: CPT

## 2022-08-21 PROCEDURE — 87081 CULTURE SCREEN ONLY: CPT | Performed by: EMERGENCY MEDICINE

## 2022-08-21 PROCEDURE — C9803 HOPD COVID-19 SPEC COLLECT: HCPCS | Performed by: EMERGENCY MEDICINE

## 2022-08-21 NOTE — ED TRIAGE NOTES
After triage assessment completed pt reports she was recently exposed to sister with strep and flu. Indirectly exposed to covid through family

## 2022-08-21 NOTE — ED PROVIDER NOTES
--- PROVIDER IN TRIAGE NOTE ---    Time: 3:43 PM EDT    History of Present Illness:  Patient is a 27 y.o. year old female who presents to the emergency department with   Chief Complaint   Patient presents with   • Cough   • Fever   • Headache   • Sore Throat   • Weakness - Generalized          History provided by:  Patient  Illness  Location:  Generalized  Quality:  Aches  Severity:  Severe  Onset quality:  Sudden  Duration:  1 day  Timing:  Constant  Progression:  Worsening  Chronicity:  New  Context:  Exposure to COVID and strep indirectly  Relieved by:  Nothing  Worsened by:  Nothing  Ineffective treatments:  None tried  Associated symptoms: cough, fatigue, fever, headaches, myalgias and sore throat    Associated symptoms: no abdominal pain, no chest pain, no congestion, no diarrhea, no ear pain, no loss of consciousness, no nausea, no rash, no rhinorrhea, no shortness of breath, no vomiting and no wheezing          Patient Care Team  Primary Care Provider: Provider, No Known    Past Medical History:     Allergies   Allergen Reactions   • Latex Unknown - High Severity     Past Medical History:   Diagnosis Date   • Anemia    • Asthma    • Hypoglycemia      Past Surgical History:   Procedure Laterality Date   • DENTAL PROCEDURE       History reviewed. No pertinent family history.    Home Medications:  Prior to Admission medications    Medication Sig Start Date End Date Taking? Authorizing Provider   multivitamin with minerals tablet tablet Take 1 tablet by mouth Daily.    Provider, Historical, MD        Social History:   Social History     Tobacco Use   • Smoking status: Never Smoker   • Smokeless tobacco: Never Used   Substance Use Topics   • Alcohol use: Yes     Comment: Occasional   • Drug use: Never       Review of Systems:  Review of Systems   Constitutional: Positive for chills, fatigue and fever.   HENT: Positive for sore throat. Negative for congestion, ear pain and rhinorrhea.    Eyes: Negative for pain.  "  Respiratory: Positive for cough. Negative for chest tightness, shortness of breath and wheezing.    Cardiovascular: Negative for chest pain.   Gastrointestinal: Negative for abdominal pain, diarrhea, nausea and vomiting.   Genitourinary: Negative for flank pain and hematuria.   Musculoskeletal: Positive for myalgias. Negative for joint swelling.   Skin: Negative for pallor and rash.   Neurological: Positive for headaches. Negative for seizures and loss of consciousness.   All other systems reviewed and are negative.       Physical Exam:  /83 (BP Location: Left arm, Patient Position: Sitting)   Pulse 111   Temp 99.6 °F (37.6 °C) (Oral)   Resp 20   Ht 165.1 cm (65\")   Wt 64.5 kg (142 lb 3.2 oz)   SpO2 100%   BMI 23.66 kg/m²     Physical Exam  Vitals and nursing note reviewed.   Constitutional:       General: She is not in acute distress.     Appearance: Normal appearance. She is not toxic-appearing.   HENT:      Head: Normocephalic and atraumatic.      Right Ear: Tympanic membrane is not erythematous.      Left Ear: Tympanic membrane is not erythematous.      Mouth/Throat:      Mouth: Mucous membranes are moist.      Pharynx: Posterior oropharyngeal erythema present. No pharyngeal swelling, oropharyngeal exudate or uvula swelling.   Eyes:      General: No scleral icterus.  Cardiovascular:      Rate and Rhythm: Normal rate and regular rhythm.      Pulses: Normal pulses.      Heart sounds: Normal heart sounds.   Pulmonary:      Effort: Pulmonary effort is normal. No respiratory distress.      Breath sounds: Normal breath sounds.   Abdominal:      General: Abdomen is flat.      Palpations: Abdomen is soft.      Tenderness: There is no abdominal tenderness.   Musculoskeletal:         General: Normal range of motion.      Cervical back: Normal range of motion and neck supple.   Skin:     General: Skin is warm and dry.   Neurological:      Mental Status: She is alert and oriented to person, place, and time. " Mental status is at baseline.                Medications in the Emergency Department:  Medications - No data to display     Labs  Lab Results (last 24 hours)     Procedure Component Value Units Date/Time    Rapid Strep A Screen - Swab, Throat [[id number]]  (Normal) Collected: 08/21/22 1509    Specimen: Swab from Throat Updated: 08/21/22 1609     Strep A Ag Negative    Influenza Antigen, Rapid - Swab, Nasopharynx [[id number]]  (Normal) Collected: 08/21/22 1509    Specimen: Swab from Nasopharynx Updated: 08/21/22 1620     Influenza A Ag, EIA Negative     Influenza B Ag, EIA Negative    COVID-19,APTIMA PANTHER(MARIA LUZ),[facility], NP/OP SWAB IN UTM/VTM/[facility],24 HR TAT - Swab, Nasopharynx [[id number]] Collected: 08/21/22 1509    Specimen: Swab from Nasopharynx Updated: 08/21/22 1523    Beta Strep Culture, Throat - Swab, Throat [[id number]] Collected: 08/21/22 1509    Specimen: Swab from Throat Updated: 08/21/22 1609           Imaging:  No Radiology Exams Resulted Within Past 24 Hours    Procedures:  Procedures      Medical Decision Making:  MDM  Number of Diagnoses or Management Options  Nonspecific syndrome suggestive of viral illness  Suspected COVID-19 virus infection  Diagnosis management comments: The patient is resting comfortably and feels better, is alert and in no distress. Influenza swab is negative.  On re-examination the patient does not appear toxic and has no meningeal signs (including a negative Kernig and Brudzinski sign), and there's no intractable vomiting, respiratory distress and no apparent pain. Based on the history, exam, diagnostic testing and reassessment, the patient has no signs of meningitis, significant pneumonia, pyelonephritis, sepsis or other acute serious bacterial infections, or other significant pathology to warrant further testing, continued ED treatment, admission or specialist evaluation. The patient's vital signs have been stable. The patient's condition is stable and  is appropriate for discharge.  The patient´s symptoms are consistent with a viral syndrome. The patient was counseled to return to the ED for re-evaluation for worsening cough, shortness of breath, uncontrollable headache, uncontrollable fever, altered mental status, or any symptoms which cause them concern. The patient will pursue further outpatient evaluation with the primary care physician or other designated or consultant physician as indicated in the discharge instructions.       Amount and/or Complexity of Data Reviewed  Clinical lab tests: reviewed         Progress  ED Course as of 08/21/22 1752   Sun Aug 21, 2022   2443 -- PROVIDER IN TRIAGE --  Patient was evaluated by me in triage, Rashi Gonzalez PA-C.  Patient is awaiting final results and disposition.  [MD]      ED Course User Index  [MD] Rashi Gonzalez PA-C       This patient was evaluated in triage. Orders were placed. Patient is currently awaiting final disposition.       Final diagnoses:   Suspected COVID-19 virus infection   Nonspecific syndrome suggestive of viral illness       ED Disposition     ED Disposition   Discharge    Condition   Stable    Comment   --               This medical record created using voice recognition software.           Suresh Emerson, APRN  08/21/22 4736

## 2022-08-22 LAB — SARS-COV-2 RNA PNL SPEC NAA+PROBE: DETECTED

## 2022-08-23 LAB — BACTERIA SPEC AEROBE CULT: NORMAL
